# Patient Record
Sex: MALE | Race: WHITE | NOT HISPANIC OR LATINO | URBAN - METROPOLITAN AREA
[De-identification: names, ages, dates, MRNs, and addresses within clinical notes are randomized per-mention and may not be internally consistent; named-entity substitution may affect disease eponyms.]

---

## 2024-04-10 ENCOUNTER — OFFICE (OUTPATIENT)
Dept: URBAN - METROPOLITAN AREA CLINIC 34 | Facility: CLINIC | Age: 79
End: 2024-04-10
Payer: OTHER GOVERNMENT

## 2024-04-10 VITALS
WEIGHT: 220 LBS | DIASTOLIC BLOOD PRESSURE: 55 MMHG | SYSTOLIC BLOOD PRESSURE: 81 MMHG | HEART RATE: 97 BPM | TEMPERATURE: 97.7 F | HEIGHT: 71 IN

## 2024-04-10 DIAGNOSIS — K92.1 MELENA: ICD-10-CM

## 2024-04-10 DIAGNOSIS — I48.0 PAROXYSMAL ATRIAL FIBRILLATION: ICD-10-CM

## 2024-04-10 DIAGNOSIS — D64.9 ANEMIA, UNSPECIFIED: ICD-10-CM

## 2024-04-10 DIAGNOSIS — R19.7 DIARRHEA, UNSPECIFIED: ICD-10-CM

## 2024-04-10 PROCEDURE — 99204 OFFICE O/P NEW MOD 45 MIN: CPT | Performed by: NURSE PRACTITIONER

## 2024-04-10 NOTE — SERVICEHPINOTES
MARIVEL MELCHOR   is a   78   year old male who is being seen in consultation at the request of   DELLA RIVERA   for anemia. pt poor historian and no records.     pt noticed blood in stool occasionally maybe 3-5 times total, noticed black tarry stools only happened a few times in conjunction with diarrhea, not sure when the stool were black.  diarrhea for 1 month, no longer having it., stopped 5 days ago.    denies v, abdominal pain, chest pain, dysphagia. weight loss "10 pounds at most but didn't keep track.  occasional episodes of dizziness. br occasionally nausea. 
br
br indigestion whole life takes tums and occurs random and can't tell when and the tums help. br
br First ER vist December 28 for syncope and had a blood transfusion and was transferred to [facility] the next day there for 2 weeks. 
br
br chronic cough for 2 years, no hematemesis br
br Has a fib for 2 years found via routine physical. Does not drink alcohol x 2 months , daily drinker  2-3. br
br last colonoscopy a couple of months ago  [facility] in January. br
br Father has stomach cancer labs from ER 12/29/23 prothrombin time 18.3 (H), total bili 2.5 (H), direct jonny 1.05 (H), AST 63 (H), ALT 24, albumin 3.0 (L), wbc 7.2, hemoglobin 6.2 (L), hematocrit 21 (L)br